# Patient Record
Sex: MALE | Race: WHITE
[De-identification: names, ages, dates, MRNs, and addresses within clinical notes are randomized per-mention and may not be internally consistent; named-entity substitution may affect disease eponyms.]

---

## 2020-02-15 ENCOUNTER — HOSPITAL ENCOUNTER (EMERGENCY)
Dept: HOSPITAL 50 - VM.ED | Age: 85
Discharge: HOME | End: 2020-02-15
Payer: MEDICARE

## 2020-02-15 VITALS — DIASTOLIC BLOOD PRESSURE: 76 MMHG | SYSTOLIC BLOOD PRESSURE: 152 MMHG | HEART RATE: 92 BPM

## 2020-02-15 DIAGNOSIS — K21.9: Primary | ICD-10-CM

## 2020-02-15 DIAGNOSIS — Z79.899: ICD-10-CM

## 2020-02-15 DIAGNOSIS — Z79.82: ICD-10-CM

## 2020-02-15 DIAGNOSIS — Z88.5: ICD-10-CM

## 2020-02-15 LAB
ANION GAP SERPL CALC-SCNC: 14.3 MMOL/L (ref 10–20)
CHLORIDE SERPL-SCNC: 106 MMOL/L (ref 98–107)
SODIUM SERPL-SCNC: 142 MMOL/L (ref 136–145)

## 2020-02-15 PROCEDURE — 85610 PROTHROMBIN TIME: CPT

## 2020-02-15 PROCEDURE — 99284 EMERGENCY DEPT VISIT MOD MDM: CPT

## 2020-02-15 PROCEDURE — 84484 ASSAY OF TROPONIN QUANT: CPT

## 2020-02-15 PROCEDURE — 85025 COMPLETE CBC W/AUTO DIFF WBC: CPT

## 2020-02-15 PROCEDURE — 36415 COLL VENOUS BLD VENIPUNCTURE: CPT

## 2020-02-15 PROCEDURE — 93005 ELECTROCARDIOGRAM TRACING: CPT

## 2020-02-15 PROCEDURE — 93010 ELECTROCARDIOGRAM REPORT: CPT

## 2020-02-15 PROCEDURE — 80053 COMPREHEN METABOLIC PANEL: CPT

## 2020-02-15 NOTE — EDM.PDOC
ED HPI GENERAL MEDICAL PROBLEM





- General


Chief Complaint: Chest Pain


Stated Complaint: Epigastric Pain


Time Seen by Provider: 02/15/20 00:50


Source of Information: Reports: Patient


History Limitations: Reports: No Limitations





- History of Present Illness


INITIAL COMMENTS - FREE TEXT/NARRATIVE: 





Pt. presents to ER with complaints of reproducible abdominal discomfort. He 

states that he has a history of GERD and also has a history of CAD in the past 

with a CABG and several stents. He states that he started having the discomfort 

at around 4 this afternoon. He states that he took an antacid earlier in the 

evening and states that the discomfort has improved significantly. 


Denies any jaw, substernal chest, neck, or back pain. Denies any shortness of 

breath.


Pt. denies any cough. No fever or chills. No nausea, vomiting, or diarrhea. No 

blood in stools.


Onset Date: 02/14/20


Location: Reports: Abdomen


Quality: Reports: Burning


Severity: Moderate





- Related Data


 Allergies











Allergy/AdvReac Type Severity Reaction Status Date / Time


 


morphine AdvReac Intermediate Nausea and Verified 02/15/20 00:55





   Vomiting  











Home Meds: 


 Home Meds





Aspirin [Suhail Chewable] 81 mg PO DAILY 08/07/15 [History]


Cholecalciferol (Vitamin D3) [Vitamin D3] 1 tab PO DAILY 08/07/15 [History]


Enalapril [Vasotec] 10 mg PO BID 08/07/15 [History]


Etodolac 400 mg PO BID PRN 08/07/15 [History]


Metoprolol Tartrate 25 mg PO BID 08/07/15 [History]


Multivitamin [Multiple Vitamins] 1 tab PO DAILY 08/07/15 [History]


Nitroglycerin [Nitrostat] 0.4 mg SL ASDIRECTED PRN 08/07/15 [History]


Omeprazole Magnesium [Prilosec Otc] 1 tab PO DAILY 08/07/15 [History]


Simvastatin [Zocor] 40 mg PO BEDTIME 08/07/15 [History]


amLODIPine [Norvasc] 5 mg PO DAILY 08/07/15 [History]


tiZANidine HCl [Zanaflex] 2 mg PO BID PRN 08/07/15 [History]











Past Medical History


Other Cardiovascular History: Bilateral carotid artery disease


Other Gastrointestinal History: Anal and rectal polyps.  Lexus-dang syndrome.

  diverticulosis


Other Genitourinary History: Elevated PSA


Other Musculoskeletal History: Lumbar disc with radiculopathy.  Acute left hip 

pain


Other Endocrine/Metabolic History: Hyperglycemia


Other Dermatologic History: skin cancer to nose and left shoulder





ED ROS GENERAL





- Review of Systems


Review Of Systems: See Below


Constitutional: Reports: No Symptoms


HEENT: Reports: No Symptoms


Respiratory: Reports: No Symptoms


Cardiovascular: Reports: No Symptoms


Endocrine: Reports: No Symptoms


GI/Abdominal: Reports: Abdominal Pain


: Reports: No Symptoms


Musculoskeletal: Reports: No Symptoms


Skin: Reports: No Symptoms


Neurological: Reports: No Symptoms


Psychiatric: Reports: No Symptoms


Hematologic/Lymphatic: Reports: No Symptoms


Immunologic: Reports: No Symptoms





ED EXAM, GENERAL





- Physical Exam


Exam: See Below


Exam Limited By: No Limitations


General Appearance: Alert, WD/WN, No Apparent Distress


Nose: Normal Inspection, Normal Mucosa, No Blood


Throat/Mouth: Normal Inspection, Normal Lips, Normal Teeth, Normal Gums, Normal 

Oropharynx, Normal Voice, No Airway Compromise


Head: Atraumatic, Normocephalic


Neck: Normal Inspection, Supple, Non-Tender, Full Range of Motion


Respiratory/Chest: No Respiratory Distress, Lungs Clear, Normal Breath Sounds, 

No Accessory Muscle Use, Chest Non-Tender


Cardiovascular: Normal Peripheral Pulses, Regular Rate, Rhythm, No Edema, No 

Gallop, No JVD, No Murmur, No Rub


Peripheral Pulses: 4+: Radial (L)


GI/Abdominal: Normal Bowel Sounds, Soft, No Organomegaly, No Distention, Tender


 (Male) Exam: Deferred


Rectal (Males) Exam: Deferred


Back Exam: Normal Inspection, Full Range of Motion


Extremities: Normal Inspection, Normal Range of Motion, Non-Tender, No Pedal 

Edema, Normal Capillary Refill


Neurological: Alert, Oriented, CN II-XII Intact, Normal Cognition, Normal Gait, 

Normal Reflexes, No Motor/Sensory Deficits


Psychiatric: Normal Affect, Normal Mood


Skin Exam: Warm, Dry, Intact, Normal Color, No Rash


Lymphatic: No Adenopathy





EKG INTERPRETATION


Rhythm: NSR


Axis: Normal


P-Wave: Present


QRS: RBBB


ST-T: Normal


QT: Normal


Comparison: No Change





Course





- Orders/Labs/Meds


Orders: 





 Active Orders 24 hr











 Category Date Time Status


 


 EKG Documentation Completion [RC] STAT Care  02/15/20 00:57 Active











Labs: 





 Laboratory Tests











  02/15/20 02/15/20 02/15/20 Range/Units





  01:27 01:27 01:27 


 


WBC  8.3    (4.0-10.0)  x10^3/uL


 


RBC  4.79    (4.5-6.0)  x10^6/uL


 


Hgb  14.5    (14.0-18.0)  g/dL


 


Hct  42.9    (40.0-52.0)  %


 


MCV  89.6    (78.0-93.0)  fL


 


MCH  30.3    (26.0-32.0)  pg


 


MCHC  33.8    (32.0-36.0)  g/dL


 


RDW Coeff of Sree  13.4    (10.0-15.0)  %


 


Plt Count  184    (130-400)  x10^3/uL


 


Neut % (Auto)  91.6 H    (50.0-80.0)  %


 


Lymph % (Auto)  3.9 L    (25.0-50.0)  %


 


Mono % (Auto)  4.4    (2.0-11.0)  %


 


Eos % (Auto)  0.0    (0.0-4.0)  %


 


Baso % (Auto)  0.1 L    (0.2-1.2)  %


 


PT   10.4   (10.0-12.8)  SEC


 


INR   0.9 L   (2.0-3.5)  


 


Sodium    142  (136-145)  mmol/L


 


Potassium    4.3  (3.5-5.1)  mmol/L


 


Chloride    106  ()  mmol/L


 


Carbon Dioxide    26  (21-32)  mmol/L


 


Anion Gap    14.3  (10-20)  mmol/L


 


BUN    33 H  (7-18)  mg/dL


 


Creatinine    1.3  (0.70-1.30)  mg/dL


 


Est Cr Clr Drug Dosing    40.56  mL/min


 


Estimated GFR (MDRD)    52  


 


Glucose    143 H  ()  mg/dL


 


Calcium    8.6  (8.5-10.1)  mg/dL


 


Corrected Calcium    9.08  (8.5-10.1)  mg/dL


 


Total Bilirubin    0.5  (0.2-1.0)  mg/dL


 


AST    21  (15-37)  U/L


 


ALT    20  (16-63)  U/L


 


Alkaline Phosphatase    69  ()  U/L


 


Troponin I    < 0.017  (<=0.056)  ng/mL


 


Total Protein    7.0  (6.4-8.2)  g/dL


 


Albumin    3.4  (3.4-5.0)  g/dL


 


Globulin    3.6  


 


Albumin/Globulin Ratio    0.94  











Meds: 





Medications














Discontinued Medications














Generic Name Dose Route Start Last Admin





  Trade Name Miguel A  PRN Reason Stop Dose Admin


 


Al Hydroxide/Mg Hydroxide  30 ml  02/15/20 00:57  02/15/20 01:08





  Gi Cocktail  PO  02/15/20 00:58  30 ml





  ONETIME ONE   Administration





     





     





     





     














Departure





- Departure


Time of Disposition: 02:05


Disposition: Home, Self-Care 01


Clinical Impression: 


 Gastroesophageal reflux disease








- Discharge Information


Instructions:  Heartburn, Easy-to-Read


Referrals: 


Alta Trotter MD [Primary Care Provider] - 


Forms:  ED Department Discharge


Additional Instructions: 


Return to ER if you have worsening discomfort, pain in your chest, or shortness 

of breath.





Recheck in clinic in 7-10 days, sooner if not improving.





- My Orders


Last 24 Hours: 





My Active Orders





02/15/20 00:57


EKG Documentation Completion [RC] STAT 














- Assessment/Plan


Last 24 Hours: 





My Active Orders





02/15/20 00:57


EKG Documentation Completion [RC] STAT 











Plan: 





Pt. was given a GI cocktail with complete resolution of his symptoms. He wished 

to be discharged. He was advised to return to ER if the discomfort worsens or 

returns, or if his has palpitations, shortness of breath, or other worrisome 

signs/symptoms. Recheck in clinic in 7-10 days.